# Patient Record
Sex: MALE | Race: WHITE | NOT HISPANIC OR LATINO | RURAL
[De-identification: names, ages, dates, MRNs, and addresses within clinical notes are randomized per-mention and may not be internally consistent; named-entity substitution may affect disease eponyms.]

---

## 2023-04-18 ENCOUNTER — TELEPHONE (OUTPATIENT)
Dept: CARDIOLOGY | Facility: CLINIC | Age: 58
End: 2023-04-18
Payer: COMMERCIAL

## 2023-04-18 NOTE — TELEPHONE ENCOUNTER
PATIENT CALLED AND STATED HE HAS HAD APPOINTMENT RESCHEDULED TWICE NOW AND HE NEEDS AN ORDER FOR CPAP SUPPLIES TO GET HIM THROUGH UNTIL HIS APPOINTMENT ON MAY 5TH. HE STATES HE GETS HIS SUPPLIES FROM [facility]. PLEASE CALL BACK [phone].

## 2023-04-19 DIAGNOSIS — G47.33 OSA (OBSTRUCTIVE SLEEP APNEA): Primary | ICD-10-CM

## 2023-04-19 NOTE — TELEPHONE ENCOUNTER
Left message for pt that supply order had been called in and to call back if he needed further assistance.

## 2023-05-11 ENCOUNTER — OFFICE VISIT (OUTPATIENT)
Dept: CARDIOLOGY | Facility: CLINIC | Age: 58
End: 2023-05-11
Payer: COMMERCIAL

## 2023-05-11 VITALS
HEIGHT: 72 IN | DIASTOLIC BLOOD PRESSURE: 70 MMHG | BODY MASS INDEX: 36.44 KG/M2 | HEART RATE: 71 BPM | WEIGHT: 269 LBS | OXYGEN SATURATION: 95 % | SYSTOLIC BLOOD PRESSURE: 110 MMHG

## 2023-05-11 DIAGNOSIS — I10 HYPERTENSION, UNSPECIFIED TYPE: ICD-10-CM

## 2023-05-11 DIAGNOSIS — G47.30 SLEEP APNEA, UNSPECIFIED TYPE: Primary | ICD-10-CM

## 2023-05-11 DIAGNOSIS — G47.33 OSA (OBSTRUCTIVE SLEEP APNEA): ICD-10-CM

## 2023-05-11 PROBLEM — E11.9 DIABETES MELLITUS: Status: ACTIVE | Noted: 2023-05-11

## 2023-05-11 RX ORDER — EMPAGLIFLOZIN 25 MG/1
TABLET, FILM COATED ORAL
COMMUNITY
Start: 2023-04-11

## 2023-05-11 RX ORDER — GLIPIZIDE 10 MG/1
TABLET, FILM COATED, EXTENDED RELEASE ORAL
COMMUNITY
Start: 2023-04-11

## 2023-05-11 RX ORDER — METFORMIN HYDROCHLORIDE 500 MG/1
TABLET, EXTENDED RELEASE ORAL
COMMUNITY
Start: 2023-04-11

## 2023-05-11 RX ORDER — SILDENAFIL 25 MG/1
TABLET, FILM COATED ORAL
COMMUNITY
Start: 2023-04-11

## 2023-05-11 RX ORDER — ATORVASTATIN CALCIUM 20 MG/1
TABLET, FILM COATED ORAL
COMMUNITY
Start: 2023-04-11

## 2023-05-11 RX ORDER — LANSOPRAZOLE 30 MG/1
CAPSULE, DELAYED RELEASE ORAL
COMMUNITY
Start: 2023-02-22 | End: 2023-05-11 | Stop reason: ALTCHOICE

## 2023-05-11 RX ORDER — LISINOPRIL 10 MG/1
TABLET ORAL
COMMUNITY
Start: 2023-04-11

## 2023-05-11 NOTE — ASSESSMENT & PLAN NOTE
Doing well on PAP therapy with good control and compliance. Download reviewed and interpreted today, 100% compliance with AHI of 0.9.  We will continue PAP therapy at current settings.   Order for new supplies sent to [facility] [employer].

## 2023-05-11 NOTE — ASSESSMENT & PLAN NOTE
Hypertension is stable.  Continue current treatment regimen.  Dietary sodium restriction.  Weight loss.  Regular aerobic exercise.  Blood pressure will be reassessed at the next regular appointment.

## 2023-10-10 NOTE — PROGRESS NOTES
Follow-Up Sleep Consult     Date:   2023  Name: Nura La  :   1965  PCP: Alejandro Luong DO    Chief Complaint   Patient presents with   • Sleep Apnea       Subjective     History of Present Illness  Nura La is a 58 y.o. male who presents today for follow-up on JOSE.  Patient is doing well on PAP therapy with good control compliance.  Download reviewed and interpreted today.  Compliance is 100%, AHI 0.9.  Patient is benefiting from PAP therapy, he denies excessive daytime sleepiness or fatigue.  He uses a full face mask and does well with that.  Airflow is comfortable.  He uses 2 L of oxygen HS with CPAP.  He denies any chest pain, shortness of breath, dizziness or syncope.  He has coexisting hypertension and diabetes that is managed by primary care provider.  Overall patient is doing very well.    History of JOSE with a baseline AHI of 86 on 2017.  Last titration was 12 cm on 2018.    Current Treatment: CPAP settings 10-16 cm.  Device Download 2023 - 5/10/2023  AHI on download is 0.9.  Compliance on download is 100%.  When used >4 hours is 100%.   Average use per night is 10 hours and 35 minutes.  Current mask used is full face mask    The patient's relevant past medical, surgical, family, and social history reviewed and updated in Epic as appropriate.    Past Medical History:   Diagnosis Date   • Diabetes mellitus    • Hypertension    • Sleep apnea     AHI 86/AutoCPAP/oxygen     Past Surgical History:   Procedure Laterality Date   • BACK SURGERY     • COLONOSCOPY     • ENDOSCOPY     • RHINOPLASTY     • TONSILLECTOMY     • VASECTOMY         No Known Allergies  Prior to Admission medications    Medication Sig Start Date End Date Taking? Authorizing Provider   atorvastatin (LIPITOR) 20 MG tablet  23   Shira Lagos MD   glipizide (GLUCOTROL XL) 10 MG 24 hr tablet  23   Shira Lagos MD   Jardiance 25 MG tablet tablet  23   Shira Lagos  "MD   lisinopril (PRINIVIL,ZESTRIL) 10 MG tablet  4/11/23   ProviderShira MD   metFORMIN ER (GLUCOPHAGE-XR) 500 MG 24 hr tablet  4/11/23   ProviderShira MD   sildenafil (VIAGRA) 25 MG tablet  4/11/23   Shira Lagos MD   lansoprazole (PREVACID) 30 MG capsule  2/22/23 5/11/23  ProviderShira MD     Family History   Problem Relation Age of Onset   • COPD Mother    • Arthritis Mother    • No Known Problems Father    • No Known Problems Brother        Objective     Vital Signs:  /70   Pulse 71   Ht 182.9 cm (72\")   Wt 122 kg (269 lb)   SpO2 95%   BMI 36.48 kg/m²     Class 2 Severe Obesity (BMI >=35 and <=39.9). Obesity-related health conditions include the following: obstructive sleep apnea and hypertension. Obesity is unchanged. BMI is is above average; BMI management plan is completed. We discussed portion control and increasing exercise.        Physical Exam  Vitals reviewed.   Constitutional:       Appearance: Normal appearance.   HENT:      Head: Normocephalic.   Cardiovascular:      Rate and Rhythm: Normal rate and regular rhythm.      Heart sounds: Normal heart sounds.   Pulmonary:      Effort: Pulmonary effort is normal.      Breath sounds: Normal breath sounds.   Musculoskeletal:      Right lower leg: No edema.      Left lower leg: No edema.   Skin:     General: Skin is warm and dry.      Capillary Refill: Capillary refill takes less than 2 seconds.   Neurological:      General: No focal deficit present.      Mental Status: He is alert and oriented to person, place, and time.   Psychiatric:         Mood and Affect: Mood normal.         Behavior: Behavior normal.             PAP download reviewed: 4/11/2023 - 5/10/2023         Assessment and Plan     Diagnoses and all orders for this visit:    1. Sleep apnea, unspecified type (Primary)  Assessment & Plan:  Doing well on PAP therapy with good control and compliance. Download reviewed and interpreted today, 100% compliance " with AHI of 0.9.  We will continue PAP therapy at current settings.   Order for new supplies sent to [facility].       2. Hypertension, unspecified type  Assessment & Plan:  Hypertension is stable.  Continue current treatment regimen.  Dietary sodium restriction.  Weight loss.  Regular aerobic exercise.  Blood pressure will be reassessed at the next regular appointment.      3. JOSE (obstructive sleep apnea)  -     PAP Therapy      Sleep hygiene discussed and Report if any new/changing symptoms immediately         Follow Up  Return in about 6 months (around 11/11/2023).  Patient was given instructions and counseling regarding his condition or for health maintenance advice. Please see specific information pulled into the AVS if appropriate.   none

## 2023-11-07 ENCOUNTER — OFFICE VISIT (OUTPATIENT)
Dept: CARDIOLOGY | Facility: CLINIC | Age: 58
End: 2023-11-07
Payer: COMMERCIAL

## 2023-11-07 VITALS
OXYGEN SATURATION: 96 % | WEIGHT: 270 LBS | HEIGHT: 72 IN | HEART RATE: 71 BPM | BODY MASS INDEX: 36.57 KG/M2 | SYSTOLIC BLOOD PRESSURE: 134 MMHG | DIASTOLIC BLOOD PRESSURE: 66 MMHG

## 2023-11-07 DIAGNOSIS — G47.33 OSA (OBSTRUCTIVE SLEEP APNEA): Primary | ICD-10-CM

## 2023-11-07 DIAGNOSIS — I10 HYPERTENSION, UNSPECIFIED TYPE: ICD-10-CM

## 2023-11-07 PROCEDURE — 1160F RVW MEDS BY RX/DR IN RCRD: CPT | Performed by: NURSE PRACTITIONER

## 2023-11-07 PROCEDURE — 99213 OFFICE O/P EST LOW 20 MIN: CPT | Performed by: NURSE PRACTITIONER

## 2023-11-07 PROCEDURE — 3078F DIAST BP <80 MM HG: CPT | Performed by: NURSE PRACTITIONER

## 2023-11-07 PROCEDURE — 3075F SYST BP GE 130 - 139MM HG: CPT | Performed by: NURSE PRACTITIONER

## 2023-11-07 PROCEDURE — 1159F MED LIST DOCD IN RCRD: CPT | Performed by: NURSE PRACTITIONER

## 2023-11-07 RX ORDER — ACETAMINOPHEN 325 MG/1
TABLET ORAL
COMMUNITY
Start: 2023-07-13

## 2023-11-07 NOTE — PROGRESS NOTES
Follow-Up Sleep Consult     Date:   2023  Name: Nura La  :   1965  PCP: Alejandro Luong DO    Chief Complaint   Patient presents with    Sleep Apnea       Subjective     History of Present Illness  Nura La is a 58 y.o. male who presents today for follow-up on JOSE.  Patient is doing very well on PAP therapy with good control and compliance.  Download reviewed and interpreted today.  Compliance is 100%, average use per night is 10 hours and 12 minutes.  AHI 0.7.  Patient denies excessive daytime sleepiness or fatigue.  He is using a full facemask and doing well with that.  Airflow is comfortable.  He denies any new concerns or complaints today.  He had a cardiac work-up several years ago with Dr. Valiente in [facility].    History of JOSE with a baseline AHI of 86 on 2017.  Last titration was 12 cm on 2018. CPAP settings 10-16 cm    History of JOSE with a baseline AHI of 86.   Current Treatment: AutoCPAP 10/16cm   Current mask used is full face mask    Device Functioning Well: Yes  Mask Fit Comfortable: Yes  Air Flow Comfortable: Yes  DME Helpful for Supplies: Yes  Sleep is rested: Yes        The patient's relevant past medical, surgical, family, and social history reviewed and updated in Epic as appropriate.    Past Medical History:   Diagnosis Date    Diabetes mellitus     Hypertension     Sleep apnea     AHI 86/AutoCPAP/oxygen     Past Surgical History:   Procedure Laterality Date    BACK SURGERY      COLONOSCOPY      ENDOSCOPY      RHINOPLASTY      TONSILLECTOMY      VASECTOMY         No Known Allergies  Prior to Admission medications    Medication Sig Start Date End Date Taking? Authorizing Provider   acetaminophen (TYLENOL) 325 MG tablet  23  Yes ProviderShira MD   atorvastatin (LIPITOR) 20 MG tablet  23  Yes Provider, MD Shira   glipizide (GLUCOTROL XL) 10 MG 24 hr tablet  23  Yes ProviderShira MD   Jardiance 25 MG tablet tablet  23   "Yes Provider, Historical, MD   lisinopril (PRINIVIL,ZESTRIL) 10 MG tablet  4/11/23  Yes Provider, Historical, MD   metFORMIN ER (GLUCOPHAGE-XR) 500 MG 24 hr tablet  4/11/23  Yes Provider, Historical, MD   sildenafil (VIAGRA) 25 MG tablet  4/11/23  Yes Provider, Historical, MD     Family History   Problem Relation Age of Onset    COPD Mother     Arthritis Mother     No Known Problems Father     No Known Problems Brother        Objective     Vital Signs:  /66   Pulse 71   Ht 182.9 cm (72\")   Wt 122 kg (270 lb)   SpO2 96%   BMI 36.62 kg/m²              Physical Exam  Vitals reviewed.   Constitutional:       Appearance: Normal appearance.   HENT:      Head: Normocephalic.   Cardiovascular:      Rate and Rhythm: Normal rate and regular rhythm.      Heart sounds: Normal heart sounds.   Pulmonary:      Effort: Pulmonary effort is normal.      Breath sounds: Normal breath sounds.   Musculoskeletal:      Right lower leg: No edema.      Left lower leg: No edema.   Skin:     General: Skin is warm and dry.      Capillary Refill: Capillary refill takes less than 2 seconds.   Neurological:      General: No focal deficit present.      Mental Status: He is alert and oriented to person, place, and time.   Psychiatric:         Mood and Affect: Mood normal.         Behavior: Behavior normal.             PAP download reviewed: 10/6/2023 - 11/4/2023         Assessment and Plan     Diagnoses and all orders for this visit:    1. JOSE (obstructive sleep apnea) (Primary)  Assessment & Plan:  Patient is doing very well on PAP therapy with good control and compliance.  Download reviewed and interpreted today.  Compliance is 100%, AHI 0.7.  He denies excessive daytime sleepiness or fatigue.  - Continue PAP therapy at current settings.    Orders:  -     PAP Therapy    2. Hypertension, unspecified type  Assessment & Plan:  Hypertension is stable.  Continue current treatment regimen.  Dietary sodium restriction.  Weight loss.  Regular " aerobic exercise.  Blood pressure will be reassessed at the next regular appointment.          Report if any new/changing symptoms immediately         Follow Up  Return in about 1 year (around 11/7/2024) for JOSE.  Patient was given instructions and counseling regarding his condition or for health maintenance advice. Please see specific information pulled into the AVS if appropriate.

## 2023-11-07 NOTE — ASSESSMENT & PLAN NOTE
Patient is doing very well on PAP therapy with good control and compliance.  Download reviewed and interpreted today.  Compliance is 100%, AHI 0.7.  He denies excessive daytime sleepiness or fatigue.  - Continue PAP therapy at current settings.

## 2024-11-08 ENCOUNTER — OFFICE VISIT (OUTPATIENT)
Dept: CARDIOLOGY | Facility: CLINIC | Age: 59
End: 2024-11-08
Payer: COMMERCIAL

## 2024-11-08 VITALS
SYSTOLIC BLOOD PRESSURE: 128 MMHG | DIASTOLIC BLOOD PRESSURE: 68 MMHG | OXYGEN SATURATION: 95 % | BODY MASS INDEX: 34.67 KG/M2 | HEIGHT: 72 IN | WEIGHT: 256 LBS | HEART RATE: 77 BPM

## 2024-11-08 DIAGNOSIS — I10 HYPERTENSION, UNSPECIFIED TYPE: ICD-10-CM

## 2024-11-08 DIAGNOSIS — G47.33 OSA (OBSTRUCTIVE SLEEP APNEA): Primary | ICD-10-CM

## 2024-11-08 PROCEDURE — 99213 OFFICE O/P EST LOW 20 MIN: CPT | Performed by: NURSE PRACTITIONER

## 2024-11-08 PROCEDURE — 3074F SYST BP LT 130 MM HG: CPT | Performed by: NURSE PRACTITIONER

## 2024-11-08 PROCEDURE — 1160F RVW MEDS BY RX/DR IN RCRD: CPT | Performed by: NURSE PRACTITIONER

## 2024-11-08 PROCEDURE — 3078F DIAST BP <80 MM HG: CPT | Performed by: NURSE PRACTITIONER

## 2024-11-08 PROCEDURE — 1159F MED LIST DOCD IN RCRD: CPT | Performed by: NURSE PRACTITIONER

## 2024-11-08 RX ORDER — SEMAGLUTIDE 0.68 MG/ML
0.5 INJECTION, SOLUTION SUBCUTANEOUS WEEKLY
COMMUNITY
Start: 2024-10-18

## 2024-11-08 NOTE — ASSESSMENT & PLAN NOTE
Patient is doing very well on PAP therapy with good control and compliance.  Download reviewed and interpreted today.  Compliance is 94%, AHI is 1.7.  He denies excessive daytime sleepiness or fatigue.    - Continue PAP therapy at current settings  - Follow-up in 1 year

## 2024-11-08 NOTE — PROGRESS NOTES
Follow-Up Sleep Consult     Date:   2024  Name: Nura La  :   1965  PCP: Susanna Reece MD    Chief Complaint   Patient presents with    Sleep Apnea     Using [employer] now       Subjective     History of Present Illness  Nura La is a 59 y.o. male who presents today for follow-up on JOSE. Patient is doing very well on PAP therapy with good control and compliance. Download reviewed and interpreted today. Compliance is 94%, average use per night is 9 hours.  AHI is 1.7.  He denies excessive daytime sleepiness or fatigue.  He is using a fullface mask and doing well with that.  Airflow is comfortable.  He is using a replacement [employer] device and has not had any problems with overheating.  We discussed recent FDA warning and he does not want to replace his machine at this time.  He denies any new symptoms or concerns at this time.  He had a cardiac workup several years ago with Dr. Valiente in [facility].    History of JOSE with a baseline AHI of 86 on 2017.  Last titration was 12 cm on 2018. CPAP settings 10-16 cm    History of JOSE with a baseline AHI of 86.     Current Treatment: Auto CPAP 10-16 cm  Current mask used is fullface mask    Device Functioning Well: Yes  Mask Fit Comfortable: Yes  Air Flow Comfortable: Yes  DME Helpful for Supplies: Yes  Sleep is rested: Yes      Device Download:                 The patient's relevant past medical, surgical, family, and social history reviewed and updated in Epic as appropriate.    Past Medical History:   Diagnosis Date    Diabetes mellitus     Hypertension     Sleep apnea     AHI 86/AutoCPAP/oxygen     Past Surgical History:   Procedure Laterality Date    BACK SURGERY      COLONOSCOPY      ENDOSCOPY      RHINOPLASTY      TONSILLECTOMY      VASECTOMY         No Known Allergies  Prior to Admission medications    Medication Sig Start Date End Date Taking? Authorizing Provider   Ozempic, 0.25 or 0.5 MG/DOSE, 2 MG/3ML solution pen-injector  "Inject 0.5 mg under the skin into the appropriate area as directed 1 (One) Time Per Week. 10/18/24  Yes Provider, MD Shira   acetaminophen (TYLENOL) 325 MG tablet  7/13/23   ProviderShira MD   atorvastatin (LIPITOR) 20 MG tablet  4/11/23   ProviderShira MD   glipizide (GLUCOTROL XL) 10 MG 24 hr tablet  4/11/23   Shira Lagos MD   Jardiance 25 MG tablet tablet  4/11/23   Shira Lagos MD   lisinopril (PRINIVIL,ZESTRIL) 10 MG tablet  4/11/23   Shira Lagos MD   metFORMIN ER (GLUCOPHAGE-XR) 500 MG 24 hr tablet  4/11/23   Shira Lagos MD   sildenafil (VIAGRA) 25 MG tablet  4/11/23   Shira Lagos MD     Family History   Problem Relation Age of Onset    COPD Mother     Arthritis Mother     No Known Problems Father     No Known Problems Brother        Objective     Vital Signs:  /68   Pulse 77   Ht 182.9 cm (72\")   Wt 116 kg (256 lb)   SpO2 95%   BMI 34.72 kg/m²     BMI is >= 30 and <35. (Class 1 Obesity). The following options were offered after discussion;: exercise counseling/recommendations        Physical Exam  Vitals reviewed.   Constitutional:       Appearance: Normal appearance.   HENT:      Head: Normocephalic.   Cardiovascular:      Rate and Rhythm: Normal rate and regular rhythm.      Heart sounds: Normal heart sounds.   Pulmonary:      Effort: Pulmonary effort is normal.      Breath sounds: Normal breath sounds.   Musculoskeletal:      Right lower leg: No edema.      Left lower leg: No edema.   Skin:     General: Skin is warm and dry.      Capillary Refill: Capillary refill takes less than 2 seconds.   Neurological:      General: No focal deficit present.      Mental Status: He is alert and oriented to person, place, and time.   Psychiatric:         Mood and Affect: Mood normal.         Behavior: Behavior normal.             PAP download reviewed: Most recent PAP download reviewed         Assessment and Plan     Diagnoses and all " orders for this visit:    1. JOSE (obstructive sleep apnea) (Primary)  Assessment & Plan:  Patient is doing very well on PAP therapy with good control and compliance.  Download reviewed and interpreted today.  Compliance is 94%, AHI is 1.7.  He denies excessive daytime sleepiness or fatigue.    - Continue PAP therapy at current settings  - Follow-up in 1 year    Orders:  -     PAP Therapy    2. Hypertension, unspecified type  Assessment & Plan:  Hypertension is stable.  Continue current treatment regimen.  Dietary sodium restriction.  Weight loss.  Regular aerobic exercise.  Blood pressure will be reassessed at the next regular appointment.          Report if any new/changing symptoms immediately and Sleep risks reviewed (driving, medical, sleep death, sedating agents)         Follow Up  Return in about 1 year (around 11/8/2025).  Patient was given instructions and counseling regarding his condition or for health maintenance advice. Please see specific information pulled into the AVS if appropriate.

## 2024-11-19 ENCOUNTER — TELEPHONE (OUTPATIENT)
Dept: CARDIOLOGY | Facility: CLINIC | Age: 59
End: 2024-11-19

## 2024-11-19 NOTE — TELEPHONE ENCOUNTER
Caller: OSMAN    Relationship: Provider    Best call back number: [phone]    What form or medical record are you requesting: OFFICE VISIT NOTE FROM 11.8.24 AND JOSE SATURATION     Who is requesting this form or medical record from you: [facility]    How would you like to receive the form or medical records (pick-up, mail, fax): FAX  If fax, what is the fax number: [phone]      Timeframe paperwork needed: ASAP    Additional notes: NA